# Patient Record
Sex: FEMALE | Race: WHITE | NOT HISPANIC OR LATINO | Employment: PART TIME | URBAN - METROPOLITAN AREA
[De-identification: names, ages, dates, MRNs, and addresses within clinical notes are randomized per-mention and may not be internally consistent; named-entity substitution may affect disease eponyms.]

---

## 2021-09-15 ENCOUNTER — OFFICE VISIT (OUTPATIENT)
Dept: URGENT CARE | Facility: URGENT CARE | Age: 21
End: 2021-09-15
Payer: COMMERCIAL

## 2021-09-15 VITALS
HEART RATE: 65 BPM | WEIGHT: 145 LBS | SYSTOLIC BLOOD PRESSURE: 125 MMHG | DIASTOLIC BLOOD PRESSURE: 78 MMHG | OXYGEN SATURATION: 99 %

## 2021-09-15 DIAGNOSIS — N92.6 MISSED MENSES: Primary | ICD-10-CM

## 2021-09-15 LAB — HCG UR QL: POSITIVE

## 2021-09-15 PROCEDURE — 81025 URINE PREGNANCY TEST: CPT | Performed by: FAMILY MEDICINE

## 2021-09-15 PROCEDURE — 99202 OFFICE O/P NEW SF 15 MIN: CPT | Performed by: FAMILY MEDICINE

## 2021-09-15 RX ORDER — PROPRANOLOL HYDROCHLORIDE 80 MG/1
80 TABLET ORAL 3 TIMES DAILY
COMMUNITY

## 2021-09-15 RX ORDER — LEVOTHYROXINE SODIUM 50 UG/1
50 TABLET ORAL DAILY
COMMUNITY

## 2021-09-15 NOTE — PROGRESS NOTES
Patient presents with:  Urgent Care  Pregnancy Test: pt here for a upt       Subjective     Jackie Bashir is a 20 year old female who presents to clinic today for the following health issues:    HPI     Positive pregnancy test       Duration: lmp 7/30/21    Description (location/character/radiation): no menses, positive pregnancy test     Accompanying signs and symptoms: No bleeding no cramping    History (similar episodes/previous evaluation): None    Precipitating or alleviating factors: Not in a  long-term relationship - had intercourse once with a partner from her hometown, no intentions of establishing a relationship with this partner in    Therapies tried and outcome: stopped OCPs in 5/2022, on long term for painful menses for which11 years, advised to stop OCP due to family history of being unable to get pregnant after long term use of OCPs     Patient does not desire to maintain pregnancy, family-mother of patient is supportive        lmp 7/30/21, EDC 5/6/22  GA 6 weeks  5 days         No past medical history on file.  Social History     Tobacco Use     Smoking status: Never Smoker     Smokeless tobacco: Never Used   Substance Use Topics     Alcohol use: Not on file       Current Outpatient Medications   Medication Sig Dispense Refill     levothyroxine (SYNTHROID/LEVOTHROID) 50 MCG tablet Take 50 mcg by mouth daily       propranolol (INDERAL) 80 MG tablet Take 80 mg by mouth 3 times daily       No Known Allergies          ROS are negative, except as otherwise noted HPI      Objective    /78   Pulse 65   Wt 65.8 kg (145 lb)   LMP 07/30/2021   SpO2 99%   There is no height or weight on file to calculate BMI.  Physical Exam   GENERAL: healthy, alert and no distress  ABDOMEN: soft, nontender, no hepatosplenomegaly, no masses and bowel sounds normal  MS: no gross musculoskeletal defects noted, no edema  NEURO: Normal strength and tone, mentation intact and speech normal      Diagnostic Test  Results:  Labs reviewed in Epic  Results for orders placed or performed in visit on 09/15/21   HCG Qual, Urine ([id number])     Status: Abnormal   Result Value Ref Range    hCG Urine Qualitative Positive (A) Negative           ASSESSMENT/PLAN:      ICD-10-CM    1. Missed menses  N92.6 HCG Qual, Urine ([id number])     HCG Qual, Urine ([id number])       [facility]         I reviewed supportive care, otc meds to use if needed, expected course, and signs of concern.  Follow up as needed or if she does not improve within  1-2 days or if worsens in any way.  Reviewed red flag symptoms and is to go to the ER if experiences any of these.     The use of Dragon/[employer] dictation services may have been used to construct the content in this note; any grammatical or spelling errors are non-intentional. Please contact the author of this note directly if you are in need of any clarification.        On the day of the encounter, time spend on chart review, patient visit, review of testing, documentation and discussion with other providers was 20 minutes      Monse Hebert MD